# Patient Record
Sex: FEMALE | Race: WHITE | NOT HISPANIC OR LATINO | Employment: OTHER | URBAN - METROPOLITAN AREA
[De-identification: names, ages, dates, MRNs, and addresses within clinical notes are randomized per-mention and may not be internally consistent; named-entity substitution may affect disease eponyms.]

---

## 2018-12-16 ENCOUNTER — OFFICE VISIT (OUTPATIENT)
Dept: URGENT CARE | Facility: CLINIC | Age: 78
End: 2018-12-16
Payer: MEDICARE

## 2018-12-16 ENCOUNTER — APPOINTMENT (OUTPATIENT)
Dept: RADIOLOGY | Facility: IMAGING CENTER | Age: 78
End: 2018-12-16
Attending: PHYSICIAN ASSISTANT
Payer: MEDICARE

## 2018-12-16 VITALS
DIASTOLIC BLOOD PRESSURE: 74 MMHG | BODY MASS INDEX: 29.99 KG/M2 | WEIGHT: 180 LBS | OXYGEN SATURATION: 96 % | HEART RATE: 83 BPM | HEIGHT: 65 IN | RESPIRATION RATE: 16 BRPM | SYSTOLIC BLOOD PRESSURE: 128 MMHG | TEMPERATURE: 98.1 F

## 2018-12-16 DIAGNOSIS — S82.842A CLOSED BIMALLEOLAR FRACTURE OF LEFT ANKLE, INITIAL ENCOUNTER: ICD-10-CM

## 2018-12-16 DIAGNOSIS — S93.402A SPRAIN OF LEFT ANKLE, UNSPECIFIED LIGAMENT, INITIAL ENCOUNTER: ICD-10-CM

## 2018-12-16 PROCEDURE — 99204 OFFICE O/P NEW MOD 45 MIN: CPT | Performed by: PHYSICIAN ASSISTANT

## 2018-12-16 PROCEDURE — 73610 X-RAY EXAM OF ANKLE: CPT | Mod: 26,LT | Performed by: PHYSICIAN ASSISTANT

## 2018-12-16 ASSESSMENT — ENCOUNTER SYMPTOMS
LOSS OF CONSCIOUSNESS: 0
SENSORY CHANGE: 0
PALPITATIONS: 0
COUGH: 0
FEVER: 0
SHORTNESS OF BREATH: 0
SPEECH CHANGE: 0
SEIZURES: 0
BLURRED VISION: 0
DOUBLE VISION: 0
CHILLS: 0
DIZZINESS: 0
HEADACHES: 0
TREMORS: 0
TINGLING: 0
FOCAL WEAKNESS: 0

## 2018-12-17 NOTE — PATIENT INSTRUCTIONS
Ankle Fracture  A fracture is a break in a bone. The ankle joint is made up of three bones. These include the lower (distal)sections of your lower leg bones, called the tibia and fibula, along with a bone in your foot, called the talus. Depending on how bad the break is and if more than one ankle joint bone is broken, a cast or splint is used to protect and keep your injured bone from moving while it heals. Sometimes, surgery is required to help the fracture heal properly.  There are two general types of fractures:  · Stable fracture. This includes a single fracture line through one bone, with no injury to ankle ligaments. A fracture of the talus that does not have any displacement (movement of the bone on either side of the fracture line) is also stable.  · Unstable fracture. This includes more than one fracture line through one or more bones in the ankle joint. It also includes fractures that have displacement of the bone on either side of the fracture line.  What are the causes?  · A direct blow to the ankle.  · Quickly and severely twisting your ankle.  · Trauma, such as a car accident or falling from a significant height.  What increases the risk?  You may be at a higher risk of ankle fracture if:  · You have certain medical conditions.  · You are involved in high-impact sports.  · You are involved in a high-impact car accident.  What are the signs or symptoms?  · Tender and swollen ankle.  · Bruising around the injured ankle.  · Pain on movement of the ankle.  · Difficulty walking or putting weight on the ankle.  · A cold foot below the site of the ankle injury. This can occur if the blood vessels passing through your injured ankle were also damaged.  · Numbness in the foot below the site of the ankle injury.  How is this diagnosed?  An ankle fracture is usually diagnosed with a physical exam and X-rays. A CT scan may also be required for complex fractures.  How is this treated?  Stable fractures are treated  with a cast or splint and using crutches to avoid putting weight on your injured ankle. This is followed by an ankle strengthening program. Some patients require a special type of cast, depending on other medical problems they may have. Unstable fractures require surgery to ensure the bones heal properly. Your health care provider will tell you what type of fracture you have and the best treatment for your condition.  Follow these instructions at home:  · Review correct crutch use with your health care provider and use your crutches as directed. Safe use of crutches is extremely important. Misuse of crutches can cause you to fall or cause injury to nerves in your hands or armpits.  · Do not put weight or pressure on the injured ankle until directed by your health care provider.  · To lessen the swelling, keep the injured leg elevated while sitting or lying down.  · Apply ice to the injured area:  ¨ Put ice in a plastic bag.  ¨ Place a towel between your cast and the bag.  ¨ Leave the ice on for 20 minutes, 2-3 times a day.  · If you have a plaster or fiberglass cast:  ¨ Do not try to scratch the skin under the cast with any objects. This can increase your risk of skin infection.  ¨ Check the skin around the cast every day. You may put lotion on any red or sore areas.  ¨ Keep your cast dry and clean.  · If you have a plaster splint:  ¨ Wear the splint as directed.  ¨ You may loosen the elastic around the splint if your toes become numb, tingle, or turn cold or blue.  · Do not put pressure on any part of your cast or splint; it may break. Rest your cast only on a pillow the first 24 hours until it is fully hardened.  · Your cast or splint can be protected during bathing with a plastic bag sealed to your skin with medical tape. Do not lower the cast or splint into water.  · Take medicines as directed by your health care provider. Only take over-the-counter or prescription medicines for pain, discomfort, or fever as  directed by your health care provider.  · Do not drive a vehicle until your health care provider specifically tells you it is safe to do so.  · If your health care provider has given you a follow-up appointment, it is very important to keep that appointment. Not keeping the appointment could result in a chronic or permanent injury, pain, and disability. If you have any problem keeping the appointment, call the facility for assistance.  Contact a health care provider if:  You develop increased swelling or discomfort.  Get help right away if:  · Your cast gets damaged or breaks.  · You have continued severe pain.  · You develop new pain or swelling after the cast was put on.  · Your skin or toenails below the injury turn blue or gray.  · Your skin or toenails below the injury feel cold, numb, or have loss of sensitivity to touch.  · There is a bad smell or pus draining from under the cast.  This information is not intended to replace advice given to you by your health care provider. Make sure you discuss any questions you have with your health care provider.  Document Released: 12/15/2001 Document Revised: 05/31/2017 Document Reviewed: 07/17/2014  [employer] Interactive Patient Education © 2017 [employer]

## 2018-12-17 NOTE — PROGRESS NOTES
Subjective:      Dotty Burns is a 78 y.o. female who presents with Fall (fell in dining room last night, (L) ankle, limited ROM. (R) ribs hurts moving in certain ways. )            Fall   The accident occurred 12 to 24 hours ago. The fall occurred while walking. She landed on hard floor. Point of impact: left ankle. Pain location: left ankle. The pain is moderate. The symptoms are aggravated by movement, use of injured limb and standing. Pertinent negatives include no fever, headaches, loss of consciousness or tingling. She has tried NSAID and acetaminophen for the symptoms. The treatment provided moderate relief.       Review of Systems   Constitutional: Negative for chills and fever.   Eyes: Negative for blurred vision and double vision.   Respiratory: Negative for cough and shortness of breath.    Cardiovascular: Negative for chest pain and palpitations.   Musculoskeletal:        Left ankle pain   Skin: Negative for rash.   Neurological: Negative for dizziness, tingling, tremors, sensory change, speech change, focal weakness, seizures, loss of consciousness and headaches.   All other systems reviewed and are negative.    PMH:  has no past medical history on file.  MEDS:   Current Outpatient Prescriptions:   •  Atorvastatin Calcium (LIPITOR PO), Take  by mouth., Disp: , Rfl:   •  aspirin (ASA) 81 MG CHEW chewable tablet, Take 81 mg by mouth every day., Disp: , Rfl:   •  hydrocodone-acetaminophen (NORCO) 5-325 MG TABS per tablet, Take 1-2 Tabs by mouth every four hours as needed., Disp: 20 Tab, Rfl: 0  ALLERGIES: No Known Allergies  SURGHX: History reviewed. No pertinent surgical history.  SOCHX:  reports that she has never smoked. She has never used smokeless tobacco. She reports that she drinks alcohol. She reports that she does not use drugs.  FH: Family history was reviewed, no pertinent findings to report  Medications, Allergies, and current problem list reviewed today in Epic     Objective:     /74  "  Pulse 83   Temp 36.7 °C (98.1 °F) (Temporal)   Resp 16   Ht 1.651 m (5' 5\")   Wt 81.6 kg (180 lb)   SpO2 96%   BMI 29.95 kg/m²      Physical Exam   Constitutional: She is oriented to person, place, and time. She appears well-developed and well-nourished.  Non-toxic appearance. She does not have a sickly appearance. She does not appear ill. No distress.   HENT:   Head: Normocephalic and atraumatic.   Right Ear: External ear normal.   Left Ear: External ear normal.   Eyes: Conjunctivae and EOM are normal.   Neck: Normal range of motion. Neck supple.   Cardiovascular: Normal rate, regular rhythm, normal heart sounds, intact distal pulses and normal pulses.    Pulmonary/Chest: Effort normal and breath sounds normal.   Musculoskeletal: She exhibits tenderness. She exhibits no edema or deformity.   Swelling bilateral malleolus of left ankle. Limited ROM> No joint pain above or below injury.  Neurovascularly intact distally from injury.     Neurological: She is alert and oriented to person, place, and time. She has normal reflexes. She displays normal reflexes. She exhibits normal muscle tone. Coordination normal.   Skin: Skin is warm and dry. She is not diaphoretic.   Psychiatric: She has a normal mood and affect. Her behavior is normal. Judgment and thought content normal.   Vitals reviewed.         12/16/2018 4:06 PM    HISTORY/REASON FOR EXAM:  Lateral pain in the left ankle after ground-level fall one day ago.      TECHNIQUE/EXAM DESCRIPTION AND NUMBER OF VIEWS:  3 views of the LEFT ankle.    COMPARISON: None    FINDINGS:  Mild osteopenia is present.  There is an oblique minimally displaced fracture of the distal metaphysis of the fibula.  There is an oblique fracture through the base of the medial malleolus.  No definite posterior malleolar fracture is identified.  There is surrounding soft tissue swelling.  No dislocation is present.      Impression       Acute bimalleolar fracture of the left ankle.        "   Assessment/Plan:     1. Closed bimalleolar fracture of left ankle, initial encounter    - Posterior splint  - Non weightbearing advised.  - Pt declines crutches and pain meds  - DX-ANKLE 3+ VIEWS LEFT; Future  - REFERRAL TO ORTHOPEDICS    Differential diagnosis, natural history, supportive care discussed. Follow-up with primary care provider within 7-10 days, emergency room precautions discussed.  Patient and/or family appears understanding of information.  Handout and review of patients diagnosis and treatment was discussed extensively.

## 2021-01-14 DIAGNOSIS — Z23 NEED FOR VACCINATION: ICD-10-CM
